# Patient Record
Sex: FEMALE | Race: BLACK OR AFRICAN AMERICAN | NOT HISPANIC OR LATINO | Employment: OTHER | URBAN - METROPOLITAN AREA
[De-identification: names, ages, dates, MRNs, and addresses within clinical notes are randomized per-mention and may not be internally consistent; named-entity substitution may affect disease eponyms.]

---

## 2017-03-13 ENCOUNTER — TELEPHONE (OUTPATIENT)
Dept: OBSTETRICS AND GYNECOLOGY | Facility: CLINIC | Age: 52
End: 2017-03-13

## 2018-01-01 NOTE — TELEPHONE ENCOUNTER
----- Message from Desire Puri MA sent at 3/9/2017  9:38 AM CST -----  Contact: pt      ----- Message -----     From: Flower [place]     Sent: 3/8/2017  10:12 AM       To: [facility] Staff    Please call pt @ [phone], pt having severe breast pain, pt would like an order to get a mammograph.  
----- Message from Desire Puri MA sent at 3/9/2017  9:38 AM CST -----  Contact: pt      ----- Message -----     From: Flower Birminghma     Sent: 3/8/2017  10:12 AM       To: [facility] Staff    Please call pt @ [phone], pt having severe breast pain, pt would like an order to get a mammograph.  
Breast pain is not typically indicative of any then detrimental, she is not due for her mmg until May  Recommendations are to get off all caffeine product, sports bra to help support breast for the next 2 weeks  And Ibuprofen or aleve if she can take it for the discomfort with food as directed on the bottle for a few days then stop   
Normal Screen- (No follow-up needed)

## 2018-11-07 ENCOUNTER — TELEPHONE (OUTPATIENT)
Dept: GASTROENTEROLOGY | Facility: CLINIC | Age: 53
End: 2018-11-07

## 2018-11-07 NOTE — TELEPHONE ENCOUNTER
----- Message from Berta Tai sent at 11/7/2018 11:45 AM CST -----  Contact: pt  States she needs to schedule a hosp f/u for her liver. Nothing coming up on the schedule. Please call pt at [phone]. Thank you

## 2018-11-08 ENCOUNTER — TELEPHONE (OUTPATIENT)
Dept: GASTROENTEROLOGY | Facility: CLINIC | Age: 53
End: 2018-11-08

## 2018-11-08 NOTE — TELEPHONE ENCOUNTER
Spoke with patient. She has not been to [facility] in over two years and has Medicaid. Gave her phone number and address to make appointment with [facility], she verbalized understanding, TRUNG.

## 2018-11-08 NOTE — TELEPHONE ENCOUNTER
----- Message from Lidia Adame sent at 11/8/2018 11:51 AM CST -----  Patient returning call..[phone] ([id number])

## 2018-11-08 NOTE — TELEPHONE ENCOUNTER
Left message on answering machine to return a call to Radha Meehan 's office at [facility].